# Patient Record
Sex: FEMALE | Race: WHITE
[De-identification: names, ages, dates, MRNs, and addresses within clinical notes are randomized per-mention and may not be internally consistent; named-entity substitution may affect disease eponyms.]

---

## 2017-04-27 ENCOUNTER — HOSPITAL ENCOUNTER (OUTPATIENT)
Dept: HOSPITAL 47 - RADMAMWWP | Age: 62
End: 2017-04-27
Payer: COMMERCIAL

## 2017-04-27 DIAGNOSIS — R92.8: Primary | ICD-10-CM

## 2017-05-08 NOTE — MM
Reason for exam: additional evaluation requested from prior study.

Last mammogram was performed 1 year and 6 months ago.



History:

Patient is postmenopausal.

Family history of breast cancer in maternal aunt and endometrial cancer in 

maternal aunt.

US discontinued breast bx LT of the left breast, February 20, 2015.  Benign 

excisional biopsy of the left breast, 1990.



Physical Findings:

Nurse did not find any significant physical abnormalities on exam.



[facility] Diagnostic Mammo w CAD PAULINE

Bilateral CC and MLO view(s) were taken.

Prior study comparison: November 2, 2015, left breast MG 3d diag mammo w/cad LT.  

February 5, 2015, mammogram, performed at [place].  August 4, 2014, mammogram,

performed at [facility].

The breast tissue is heterogeneously dense. This may lower the sensitivity of 

mammography.  Stable nodule upper outer quadrant in the left breast.

No significant new findings when compared with previous films.



These results were verbally communicated with the patient on 5/8/17.





ASSESSMENT: Benign, BI-RAD 2



RECOMMENDATION:

Routine screening mammogram of both breasts in 1 year.

## 2019-04-03 ENCOUNTER — HOSPITAL ENCOUNTER (OUTPATIENT)
Dept: HOSPITAL 47 - ORWHC2ENDO | Age: 64
Discharge: HOME | End: 2019-04-03
Payer: COMMERCIAL

## 2019-04-03 VITALS — RESPIRATION RATE: 16 BRPM

## 2019-04-03 VITALS — TEMPERATURE: 97.9 F

## 2019-04-03 VITALS — BODY MASS INDEX: 24.5 KG/M2

## 2019-04-03 VITALS — SYSTOLIC BLOOD PRESSURE: 123 MMHG | DIASTOLIC BLOOD PRESSURE: 73 MMHG | HEART RATE: 61 BPM

## 2019-04-03 DIAGNOSIS — F17.200: ICD-10-CM

## 2019-04-03 DIAGNOSIS — E78.5: ICD-10-CM

## 2019-04-03 DIAGNOSIS — Z85.42: ICD-10-CM

## 2019-04-03 DIAGNOSIS — Z79.899: ICD-10-CM

## 2019-04-03 DIAGNOSIS — Z12.11: Primary | ICD-10-CM

## 2019-04-03 DIAGNOSIS — K63.89: ICD-10-CM

## 2019-04-03 DIAGNOSIS — Z86.010: ICD-10-CM

## 2019-04-03 PROCEDURE — 45378 DIAGNOSTIC COLONOSCOPY: CPT

## 2019-04-03 NOTE — P.GSHP
History of Present Illness


H&P Date: 04/03/19














CHIEF COMPLAINT: Colon screen





HISTORY OF PRESENT ILLNESS: The patient is a 63-year-old female who


presents for colon screen.  Lower endoscopy was offered for further evaluation 

and management.





PAST MEDICAL HISTORY: 


Please see list.





PAST SURGICAL HISTORY: 


Please see list.





MEDICATIONS: 


Please see list.





ALLERGIES:  Please see list. 





SOCIAL HISTORY: No illicit drug use





FAMILY HISTORY: No reports of Crohn disease or ulcerative colitis. 





REVIEW OF ORGAN SYSTEMS: 


CONSTITUTIONAL: No reports of fevers or chills.  





PHYSICAL EXAM: 


VITAL SIGNS:  Stable


GENERAL: Well-developed pleasant in no acute distress. 


HEENT: No scleral icterus. Extraocular movements grossly


intact. Moist buccal mucosa. 


NECK: Supple without lymphadenopathy. 


CHEST: Unlabored respirations. Equal bilateral excursions. 


CARDIOVASCULAR: Regular rate and rhythm. Distal 2+ pulses. 


ABDOMEN: Soft, nontender, nondistended. 


MUSCULOSKELETAL: No clubbing, cyanosis, or edema. 





ASSESSMENT: 


1.  Colon screen.





PLAN: 


1. Recommend proceeding with a lower endoscopy





Past Medical History


Past Medical History: Cancer, Hyperlipidemia


Additional Past Medical History / Comment(s): STATES HX UTERINE CA


History of Any Multi-Drug Resistant Organisms: None Reported


Past Surgical History: Breast Surgery, Tubal Ligation


Additional Past Surgical History / Comment(s): benign left lumpectomy


Past Anesthesia/Blood Transfusion Reactions: Postoperative Nausea & Vomiting 

(PONV)


Additional Past Anesthesia/Blood Transfusion Reaction / Comment(s): "HARD TIME 

WAKING UP"


Smoking Status: Current every day smoker





Medications and Allergies


                                Home Medications











 Medication  Instructions  Recorded  Confirmed  Type


 


Atorvastatin [Lipitor] 40 mg PO DAILY 03/29/19 03/29/19 History








                                    Allergies











Allergy/AdvReac Type Severity Reaction Status Date / Time


 


No Known Allergies Allergy   Verified 03/29/19 15:54

## 2019-04-03 NOTE — P.PCN
Date of Procedure: 04/03/19


Description of Procedure: 











PREOPERATIVE DIAGNOSIS:


Colonoscopy screening.


History of colon polyps





POSTOPERATIVE DIAGNOSIS:


Colonoscopy screening.


History of colon polyps





OPERATION:


Colonoscopy to the ileocecal valve and appendiceal orifice.





SURGEON: Marion Young MD.





ANESTHESIA: MAC.





INDICATIONS:


The patient is a 63-year-old female who presents for colonoscopy screening.  

Last colonoscopy over 5 years ago.  Benefits and risks were described and 

informed consent was obtained.





DESCRIPTION OF PROCEDURE:


The patient had undergone Gatorade, MiraLAX and Dulcolax prep. She had been 

brought into the operating room and laid in the left lateral decubitus position.

After adequate intravenous sedation, the rectum was examined with 2% lidocaine 

jelly. No external hemorrhoids were encountered. The rectal tone was within 

normal limits. No lesions were palpated in the rectal vault. An Olympus colonos

cope initially adult size was exchanged to a pediatric scope was advanced until 

the ileocecal valve and appendiceal orifice were clearly viewed.  A narrowing 

between 15 and 20 cm of the sigmoid colon was identified.  The prep was fair.  

No scattered diverticulosis was encountered.  No colonic polyps were found.  No 

evidence of focal colitis was found. Retroflexion of the scope demonstrated no 

internal hemorrhoids without active bleeding or inflammation. The colon was 

desufflated. The patient had tolerated the procedure well. 





Withdrawal time was over 6 minutes.





FINDINGS:


[place] preparation quality scale fair, 3 (1-5)


No internal hemorrhoids


No external prolapsed hemorrhoids.


No arteriovenous malformations.


No adenomatous polyps.


No focal colitis.


Narrowing between 15 and 20 cm of the sigmoid colon was identified. 





RECOMMENDATIONS:


Lower endoscopy in 5 years,. 2024 with pediatric colonoscopy or Cologaurd





Plan - Discharge Summary


Discharge Rx Participant: No


New Discharge Prescriptions: 


No Action


   Atorvastatin [Lipitor] 40 mg PO DAILY


Discharge Medication List





Atorvastatin [Lipitor] 40 mg PO DAILY 03/29/19 [History]








Follow up Appointment(s)/Referral(s): 


Marion Young MD [STAFF PHYSICIAN] - As Needed


Patient Instructions/Handouts:  *[facility] - (Anesthesia) Endoscopy Discharge 

Instructions


Activity/Diet/Wound Care/Special Instructions: 


Repeat colonoscopy 5 years, 2024. Use pediatric scope. May consider Cologaurd as

alternative


Discharge Disposition: HOME SELF-CARE

## 2025-07-21 ENCOUNTER — HOSPITAL ENCOUNTER (OUTPATIENT)
Dept: HOSPITAL 47 - RADCTMAIN | Age: 70
Discharge: HOME | End: 2025-07-21
Attending: FAMILY MEDICINE
Payer: MEDICARE

## 2025-07-21 DIAGNOSIS — J43.2: ICD-10-CM

## 2025-07-21 DIAGNOSIS — Z12.2: ICD-10-CM

## 2025-07-21 DIAGNOSIS — R92.333: ICD-10-CM

## 2025-07-21 DIAGNOSIS — K76.0: ICD-10-CM

## 2025-07-21 DIAGNOSIS — Z78.0: ICD-10-CM

## 2025-07-21 DIAGNOSIS — Z87.891: ICD-10-CM

## 2025-07-21 DIAGNOSIS — Z80.3: ICD-10-CM

## 2025-07-21 DIAGNOSIS — Z12.31: Primary | ICD-10-CM

## 2025-07-21 PROCEDURE — 77067 SCR MAMMO BI INCL CAD: CPT

## 2025-07-21 PROCEDURE — 71271 CT THORAX LUNG CANCER SCR C-: CPT

## 2025-07-21 PROCEDURE — 77063 BREAST TOMOSYNTHESIS BI: CPT

## 2025-07-30 ENCOUNTER — HOSPITAL ENCOUNTER (OUTPATIENT)
Dept: HOSPITAL 47 - RADUSWWP | Age: 70
Discharge: HOME | End: 2025-07-30
Attending: INTERNAL MEDICINE
Payer: MEDICARE

## 2025-07-30 DIAGNOSIS — R22.1: Primary | ICD-10-CM

## 2025-07-30 PROCEDURE — 76536 US EXAM OF HEAD AND NECK: CPT
